# Patient Record
Sex: FEMALE | Race: WHITE
[De-identification: names, ages, dates, MRNs, and addresses within clinical notes are randomized per-mention and may not be internally consistent; named-entity substitution may affect disease eponyms.]

---

## 2017-10-17 ENCOUNTER — HOSPITAL ENCOUNTER (OUTPATIENT)
Dept: HOSPITAL 92 - MAMMO | Age: 57
Discharge: HOME | End: 2017-10-17
Attending: FAMILY MEDICINE
Payer: COMMERCIAL

## 2017-10-17 DIAGNOSIS — Z12.31: Primary | ICD-10-CM

## 2017-10-17 PROCEDURE — G0202 SCR MAMMO BI INCL CAD: HCPCS

## 2017-10-17 PROCEDURE — 77067 SCR MAMMO BI INCL CAD: CPT

## 2017-10-25 NOTE — MMO
BILATERAL DIGITAL SCREENING MAMMOGRAMS:

 

Date:

10/17/2017

 

HISTORY:  

A 56-year-old female presents for digital screening mammography. 

 

COMPARISON:  

09/02/2015, 09/02/2014, and 12/30/2010.

 

FINDINGS:

This patient's mammogram was interpreted with the assistance of computer-aided detection.  

 

Scattered areas of fibroglandular density are noted bilaterally.  Stable, typically benign calcifica
tions.  No direct or indirect evidence of malignancy.

 

IMPRESSION: 

 

BIRADS 2:  Benign Finding(s)

 

Continue routine screening.

 

POS: [facility]

## 2018-04-24 ENCOUNTER — HOSPITAL ENCOUNTER (OUTPATIENT)
Dept: HOSPITAL 92 - RAD | Age: 58
Discharge: HOME | End: 2018-04-24
Attending: NURSE PRACTITIONER
Payer: COMMERCIAL

## 2018-04-24 DIAGNOSIS — M47.26: Primary | ICD-10-CM

## 2018-04-24 DIAGNOSIS — M43.17: ICD-10-CM

## 2018-04-24 PROCEDURE — 72100 X-RAY EXAM L-S SPINE 2/3 VWS: CPT

## 2018-04-24 NOTE — RAD
LUMBAR SPINE FOUR VIEWS:

4/24/18

 

HISTORY: 

Back pain. Spondylolisthesis. 

 

FINDINGS:  

there are five lumbar type vertebrae. Pedicles are intact. Vertebral body heights are maintained. Ost
eophytosis is present throughout the vertebral bodies and facets. Minimal degenerative spondylolisthe
sis at the L4-5 level is present without abnormal translational motion upon flexion or extension. 

 

IMPRESSION:  

Lumbar spondylosis. No acute osseous abnormalities are demonstrated. 

 

POS: [facility]

## 2018-10-18 ENCOUNTER — HOSPITAL ENCOUNTER (OUTPATIENT)
Dept: HOSPITAL 92 - BICMAMMO | Age: 58
Discharge: HOME | End: 2018-10-18
Attending: FAMILY MEDICINE
Payer: COMMERCIAL

## 2018-10-18 DIAGNOSIS — Z12.31: Primary | ICD-10-CM

## 2018-10-18 PROCEDURE — 77063 BREAST TOMOSYNTHESIS BI: CPT

## 2018-10-18 PROCEDURE — 77067 SCR MAMMO BI INCL CAD: CPT

## 2019-11-26 NOTE — MMO
Bilateral MAMMO Bilat Screen [facility].

 

CLINICAL HISTORY:

Patient is 58 years old and is seen for screening. The patient has no family

history of breast cancer.  The patient has no personal history of cancer.

 

VIEWS:

The views performed were:  bilateral craniocaudal with tomosynthesis and

bilateral mediolateral oblique with tomosynthesis.

 

FILMS COMPARED:

The present examination has been compared to prior imaging studies performed at

[facility] on 10/18/2018, and at [facility] on

06/21/2004, 09/02/2005 and 11/14/2008.

 

This study has been interpreted with the assistance of computer-aided detection.

 

MAMMOGRAM FINDINGS:

There are scattered fibroglandular densities.

 

There are no suspicious masses, suspicious calcifications, or new areas of

architectural distortion.

 

IMPRESSION:

THERE IS NO MAMMOGRAPHIC EVIDENCE OF MALIGNANCY.

 

A ROUTINE FOLLOW-UP MAMMOGRAM IN 1 YEAR IS RECOMMENDED.

 

THE RESULTS OF THIS EXAM WERE SENT TO THE PATIENT.

 

ACR BI-RADS Category 1 - Negative

 

MAMMOGRAPHY NOTE:

 1. A negative mammogram report should not delay a biopsy if a dominant of

 clinically suspicious mass is present.

 2. Approximately 10% to 15% of breast cancers are not detected by

 mammography.

 3. Adenosis and dense breasts may obscure an underlying neoplasm.

 

 

Reported by: ELIU LO MD

Electonically Signed: [id number]

## 2020-09-01 NOTE — OP
DATE OF PROCEDURE:  09/01/2020



PREOPERATIVE DIAGNOSIS:  Right thumb painful deep wire.



PROCEDURES PERFORMED:  

1. Right thumb deep wire removal under anesthesia, local in combination of propofol.

2. C-arm supervision.



TOURNIQUET TIME:  None.



ESTIMATED BLOOD LOSS:  10 mL.



INJECTABLE:  10 mL of 0.5% Marcaine.



FINDINGS:  After wire removal, stable 1st and 2nd intermetacarpal distance and

height. 



DESCRIPTION OF PROCEDURE:  After successful anesthesia listed above, the limb was

prepped and draped.  We gave injection, waited 5 minutes and then brought the C-arm

to the field.  Under C-arm guidance, we found the wire.  We made a 5 mm incision,

carried through skin and subcutaneous tissue, approximately 1 cm deep, we found the

wire.  We made sure no nerve was around and freed it from all underlying soft

tissue, and removed the wire.  Afterwards, radiographs were taken, that showed

excellent 1st and 2nd intermetacarpal spacing and height. 



We obtained hemostasis.  We closed the wound with one suture of 4-nylon simple, and

soft dressing was applied with bacitracin, Adaptic, 4x4, small Sapphire, and a small

3-inch Ace wrap.  She left the operating room without evidence of anesthetic or

operative complication. 







Job ID:  [id number]

## 2020-09-01 NOTE — RAD
Exam:

XR Finger(s) Rt Min 2 View



HISTORY:

Removal of right thumb hardware.



COMPARISON:

Intraoperative fluoroscopic images right wrist on 11/20/2020 and views right hand on 8/19/2020



FINDINGS/IMPRESSION:

Previously noted metallic pin transfixing the proximal aspects of the metacarpal of the thumb and ind
ex finger has been removed. 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 Correlation with intraoperative

findings is recommended.



Reported By: Ronnie Hutton 

Electronically Signed:  9/1/2020 12:12 PM

## 2020-12-14 NOTE — MMO
Bilateral MAMMO Bilat Screen [facility].

 

CLINICAL HISTORY:

Patient is 59 years old and is seen for screening. The patient has no family

history of breast cancer.  The patient has no personal history of cancer.

 

VIEWS:

The views performed were:  bilateral craniocaudal with tomosynthesis and

bilateral mediolateral oblique with tomosynthesis.

 

FILMS COMPARED:

The present examination has been compared to prior imaging studies performed at

[facility] on 10/18/2018 and 11/26/2019, and at [facility] on 09/02/2005 and 11/14/2008.

 

This study has been interpreted with the assistance of computer-aided detection.

 

MAMMOGRAM FINDINGS:

There are scattered fibroglandular densities.

 

Benign calcifications are noted bilaterally.

 

There are no suspicious masses, suspicious calcifications, or new areas of

architectural distortion.

 

IMPRESSION:

THERE IS NO MAMMOGRAPHIC EVIDENCE OF MALIGNANCY.

 

A ROUTINE FOLLOW-UP MAMMOGRAM IN 1 YEAR IS RECOMMENDED.

 

THE RESULTS OF THIS EXAM WERE SENT TO THE PATIENT.

 

ACR BI-RADS Category 2 - Benign finding

 

MAMMOGRAPHY NOTE:

 1. A negative mammogram report should not delay a biopsy if a dominant of

 clinically suspicious mass is present.

 2. Approximately 10% to 15% of breast cancers are not detected by

 mammography.

 3. Adenosis and dense breasts may obscure an underlying neoplasm.

 

 

Reported by: LUDWIN GIBSON MD

Electonically Signed: [id number]

## 2021-11-26 ENCOUNTER — HOSPITAL ENCOUNTER (OUTPATIENT)
Dept: HOSPITAL 92 - LABBT | Age: 61
Discharge: HOME | End: 2021-11-26
Attending: ORTHOPAEDIC SURGERY
Payer: COMMERCIAL

## 2021-11-26 DIAGNOSIS — M19.042: ICD-10-CM

## 2021-11-26 DIAGNOSIS — Z01.812: Primary | ICD-10-CM

## 2021-11-26 DIAGNOSIS — M18.12: ICD-10-CM

## 2021-11-26 DIAGNOSIS — Z20.822: ICD-10-CM

## 2021-11-26 LAB
BASOPHILS # BLD AUTO: 0 10X3/UL (ref 0–0.2)
BASOPHILS NFR BLD AUTO: 0.7 % (ref 0–2)
EOSINOPHIL # BLD AUTO: 0.1 10X3/UL (ref 0–0.5)
EOSINOPHIL NFR BLD AUTO: 1.8 % (ref 0–6)
HGB BLD-MCNC: 13.1 G/DL (ref 12–15.5)
LEUKOCYTE ESTERASE UR QL STRIP.AUTO: 500
LYMPHOCYTES NFR BLD AUTO: 30.6 % (ref 18–47)
MCH RBC QN AUTO: 29 PG (ref 27–33)
MCV RBC AUTO: 88.2 FL (ref 81.6–98.3)
MONOCYTES # BLD AUTO: 0.4 10X3/UL (ref 0–1.1)
MONOCYTES NFR BLD AUTO: 6.3 % (ref 0–10)
NEUTROPHILS # BLD AUTO: 3.4 10X3/UL (ref 1.5–8.4)
NEUTROPHILS NFR BLD AUTO: 60.2 % (ref 40–75)
PLATELET # BLD AUTO: 216 10X3/UL (ref 150–450)
RBC # BLD AUTO: 4.51 10X6/UL (ref 3.9–5.03)
SP GR UR STRIP: 1.01 (ref 1–1.04)
WBC # BLD AUTO: 5.7 10X3/UL (ref 3.5–10.5)

## 2021-11-26 PROCEDURE — U0005 INFEC AGEN DETEC AMPLI PROBE: HCPCS

## 2021-11-26 PROCEDURE — U0003 INFECTIOUS AGENT DETECTION BY NUCLEIC ACID (DNA OR RNA); SEVERE ACUTE RESPIRATORY SYNDROME CORONAVIRUS 2 (SARS-COV-2) (CORONAVIRUS DISEASE [COVID-19]), AMPLIFIED PROBE TECHNIQUE, MAKING USE OF HIGH THROUGHPUT TECHNOLOGIES AS DESCRIBED BY CMS-2020-01-R: HCPCS

## 2021-11-26 PROCEDURE — 81003 URINALYSIS AUTO W/O SCOPE: CPT

## 2021-11-26 PROCEDURE — 85025 COMPLETE CBC W/AUTO DIFF WBC: CPT

## 2021-11-30 ENCOUNTER — HOSPITAL ENCOUNTER (OUTPATIENT)
Dept: HOSPITAL 92 - SDC | Age: 61
Discharge: HOME | End: 2021-11-30
Attending: ORTHOPAEDIC SURGERY
Payer: COMMERCIAL

## 2021-11-30 VITALS — BODY MASS INDEX: 29.1 KG/M2

## 2021-11-30 DIAGNOSIS — E78.00: ICD-10-CM

## 2021-11-30 DIAGNOSIS — M15.1: Primary | ICD-10-CM

## 2021-11-30 DIAGNOSIS — Z79.899: ICD-10-CM

## 2021-11-30 DIAGNOSIS — K21.9: ICD-10-CM

## 2021-11-30 DIAGNOSIS — Z87.891: ICD-10-CM

## 2021-11-30 DIAGNOSIS — M18.12: ICD-10-CM

## 2021-11-30 DIAGNOSIS — E03.9: ICD-10-CM

## 2021-11-30 DIAGNOSIS — Z98.890: ICD-10-CM

## 2021-11-30 PROCEDURE — C1889 IMPLANT/INSERT DEVICE, NOC: HCPCS

## 2021-11-30 PROCEDURE — 76000 FLUOROSCOPY <1 HR PHYS/QHP: CPT

## 2021-11-30 PROCEDURE — 0RUT07Z SUPPLEMENT LEFT CARPOMETACARPAL JOINT WITH AUTOLOGOUS TISSUE SUBSTITUTE, OPEN APPROACH: ICD-10-PCS | Performed by: ORTHOPAEDIC SURGERY

## 2021-11-30 PROCEDURE — S0020 INJECTION, BUPIVICAINE HYDRO: HCPCS

## 2021-11-30 PROCEDURE — 0RGX04Z FUSION OF LEFT FINGER PHALANGEAL JOINT WITH INTERNAL FIXATION DEVICE, OPEN APPROACH: ICD-10-PCS | Performed by: ORTHOPAEDIC SURGERY

## 2021-11-30 PROCEDURE — 0LU607Z SUPPLEMENT LEFT LOWER ARM AND WRIST TENDON WITH AUTOLOGOUS TISSUE SUBSTITUTE, OPEN APPROACH: ICD-10-PCS | Performed by: ORTHOPAEDIC SURGERY

## 2021-11-30 PROCEDURE — 3E0T3BZ INTRODUCTION OF ANESTHETIC AGENT INTO PERIPHERAL NERVES AND PLEXI, PERCUTANEOUS APPROACH: ICD-10-PCS | Performed by: ORTHOPAEDIC SURGERY
